# Patient Record
Sex: MALE | Race: BLACK OR AFRICAN AMERICAN
[De-identification: names, ages, dates, MRNs, and addresses within clinical notes are randomized per-mention and may not be internally consistent; named-entity substitution may affect disease eponyms.]

---

## 2020-06-10 ENCOUNTER — HOSPITAL ENCOUNTER (OUTPATIENT)
Dept: HOSPITAL 35 - GI | Age: 60
Discharge: HOME | End: 2020-06-10
Attending: SPECIALIST
Payer: COMMERCIAL

## 2020-06-10 VITALS — HEIGHT: 67.99 IN | WEIGHT: 240 LBS | BODY MASS INDEX: 36.37 KG/M2

## 2020-06-10 DIAGNOSIS — Z12.11: Primary | ICD-10-CM

## 2020-06-10 DIAGNOSIS — Z80.0: ICD-10-CM

## 2020-06-10 DIAGNOSIS — E11.9: ICD-10-CM

## 2020-06-10 DIAGNOSIS — Z79.899: ICD-10-CM

## 2020-06-10 DIAGNOSIS — D12.5: ICD-10-CM

## 2020-06-10 DIAGNOSIS — Z11.59: ICD-10-CM

## 2020-06-10 DIAGNOSIS — Z98.890: ICD-10-CM

## 2020-06-10 DIAGNOSIS — I10: ICD-10-CM

## 2020-06-10 DIAGNOSIS — K62.1: ICD-10-CM

## 2020-06-10 PROCEDURE — 62900: CPT

## 2020-06-10 PROCEDURE — 62110: CPT

## 2020-06-11 NOTE — PATH
[facility]
[address]
[place]   [address]                     PATHOLOGY RPT PROCEDURE       
_______________________________________________________________________________
 
Name:       OMID HOOKS            Room #:                     REG CL 
MManoloR.#:      [id number]     Account #:      [id number]  
Admission:  06/10/20    Date of Birth:  [birth year]  
Discharge:                                              Report #:    [id number]
                                                        Path Case #: [id number]
_______________________________________________________________________________
 
[facility] Accession Number: [id number]
.                                                                01
Material submitted:                                        .
PART A: colon - SIGMOID COLON POLYP. Modifiers: sigmoid
PART B: rectum - RECTAL POLYP X2
.                                                                01
Clinical history:                                          .
Screening
Colon polyp, rectal polyps
.                                                                02
**********************************************************************
Diagnosis:
A.  Polyp, sigmoid colon polyp, endoscopic biopsy:
- Tubular adenoma.
- Negative for high grade dysplasia.
.
B.  Polyp x2, rectum, endoscopic biopsy:
- Hyperplastic polyps.
- Negative for dysplasia.
.
(IUV:mml; 06/11/2020)
QLM  06/11/2020  1307 Local
**********************************************************************
.                                                                02
Electronically signed:                                     .
Angelina Bennett MD, Pathologist
NPI- [id number]
.                                                                01
Gross description:                                         .
A. The specimen is received in formalin, labeled "Omid Hooks,
sigmoid colon polyp" and consists of 2 fragments of pink-tan tissue
measuring 0.4 x 0.3 cm and 0.2 x 0.2 cm which are entirely submitted in
A1.
.
B. The specimen is received in formalin, labeled "Omid Hooks,
rectal polyp x2" and consists of 2 fragments of pink-tan tissue measuring
0.4 x 0.2 cm and 0.3 x 0.2 cm which are entirely submitted in B1.
(SDY; 6/10/2020)
SYU/SYU  06/10/2020  1713 Local
.                                                                02
Pathologist provided ICD-10:
D12.5, K62.1
.                                                                02
CPT                                                        .
098541, 470573
Specimen Comment: A courtesy copy of this report has been sent to [phone]
Specimen Comment: Report sent to 
 
 
[address]   [address]                     PATHOLOGY RPT PROCEDURE       
_______________________________________________________________________________
 
Name:       OMID HOOKS            [id number]     Account #:      [id number]  
Admission:  06/10/20    Date of Birth:  [birth year]  
Discharge:                                              Report #:    [id number]
                                                        Path Case #: [id number]
_______________________________________________________________________________
***Performed at:  01
   [facility] [place]
   [address], [place]  [id number]
   MD Kyle Christopher MD Phone:  [phone]
***Performed at:  02
   [address]  [id number]
   MD Angelina Bennett MD Phone:  [phone]

## 2020-06-12 NOTE — P
[facility]
[address]
[place]   [address]                     PROCEDURE REPORT              
_______________________________________________________________________________
 
Name:       ANDRÉS HOOKS            [facility]                     [facility]      [id number]                       Account #:      [id number]  
Admission:  06/10/20    Attend Phys:    Riki Gandhi
Discharge:              Date of Birth:  [birth year]  
                                                          Report #: [id number]
                                                                    [id number]   
_______________________________________________________________________________
THIS REPORT FOR:  
 
cc:  [facility]                                                
     Riki Aldrich MD                                   ~
CC: Riki Berumen Sr. MD
    [facility]
 
DATE OF SERVICE:  06/10/2020
 
 
PROCEDURE PERFORMED:  Colonoscopy with biopsies.
 
HISTORY OF PRESENT ILLNESS:  The patient is a 59-year-old male who presents
today for routine screening colonoscopy.  No previous history of colonoscopy. 
He does have a family history of colon cancer in a grandparent.  He denies any
symptoms.
 
DESCRIPTION OF PROCEDURE:  The risks and benefits of the procedure were
explained to the patient, those risks including but not limited to bleeding,
perforation and the risk of sedation.  He understood these risks and gave
informed consent.  Sedation was given using propofol per anesthesia.  Next, a
digital rectal exam was initially performed, which was normal.  Next, using a
standard Olympus colonoscope, the scope was placed in the patient's anus and
advanced under direct vision to the cecum.  The overall prep was good.  The
cecum and ileocecal valve were normal in appearance.  Ascending, transverse and
descending colon were normal.  In the sigmoid colon, a single 3 mm sessile polyp
was noted.  This was removed with cold forceps, otherwise normal.  In the
rectum, two 3-4 mm sessile polyps were noted, both were removed with cold
forceps.  On retroflexion, no abnormalities were noted.  The scope was then
withdrawn and the procedure terminated.  The patient tolerated the procedure
well.
 
IMPRESSION:
1.  Small colonic polyps.
2.  Otherwise, normal colonoscopy.
 
RECOMMENDATIONS:
1.  Await biopsy results.
2.  Repeat colonoscopy in 5 years.
 
 
 
[facility]
[address] [place]   [address]                     PROCEDURE REPORT              
_______________________________________________________________________________
 
Name:       ANDRÉS HOOKS            Room #:                     REG CL 
GEMA#:      [id number]                       Account #:      [id number]  
Admission:  06/10/20    Attend Phys:    Riki Gandhi
Discharge:              Date of Birth:  [birth year]  
                                                          Report #: [id number]
                                                                    [id number]   
_______________________________________________________________________________
 
Thank you for allowing me to participate in his care.
 
 
 
 
 
 
 
 
 
 
 
 
 
 
 
 
 
 
 
 
 
 
 
 
 
 
 
 
 
 
 
 
 
 
 
 
 
 
 
 
 
 
  <ELECTRONICALLY SIGNED>
   By: Riki Aldrich MD   
  06/12/20     0848
D: 06/10/20 1025                           _____________________________________
T: 06/10/20 1216                           Riki Aldrich MD     /nt